# Patient Record
Sex: FEMALE | Race: WHITE | NOT HISPANIC OR LATINO | URBAN - METROPOLITAN AREA
[De-identification: names, ages, dates, MRNs, and addresses within clinical notes are randomized per-mention and may not be internally consistent; named-entity substitution may affect disease eponyms.]

---

## 2021-06-04 ENCOUNTER — OFFICE VISIT (OUTPATIENT)
Dept: INTERNAL MEDICINE | Facility: CLINIC | Age: 26
End: 2021-06-04

## 2021-06-04 VITALS
SYSTOLIC BLOOD PRESSURE: 128 MMHG | HEART RATE: 92 BPM | HEIGHT: 63 IN | TEMPERATURE: 98.6 F | DIASTOLIC BLOOD PRESSURE: 70 MMHG | BODY MASS INDEX: 29.13 KG/M2 | OXYGEN SATURATION: 98 % | WEIGHT: 164.4 LBS

## 2021-06-04 DIAGNOSIS — F41.9 ANXIETY AND DEPRESSION: Primary | ICD-10-CM

## 2021-06-04 DIAGNOSIS — F32.A ANXIETY AND DEPRESSION: Primary | ICD-10-CM

## 2021-06-04 PROCEDURE — 99203 OFFICE O/P NEW LOW 30 MIN: CPT | Performed by: FAMILY MEDICINE

## 2021-06-04 RX ORDER — SERTRALINE HYDROCHLORIDE 25 MG/1
25 TABLET, FILM COATED ORAL DAILY
Qty: 30 TABLET | Refills: 1 | Status: SHIPPED | OUTPATIENT
Start: 2021-06-04 | End: 2021-07-08 | Stop reason: DRUGHIGH
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
## 2021-07-08 ENCOUNTER — OFFICE VISIT (OUTPATIENT)
Dept: INTERNAL MEDICINE | Facility: CLINIC | Age: 26
End: 2021-07-08

## 2021-07-08 VITALS
HEIGHT: 63 IN | WEIGHT: 163 LBS | BODY MASS INDEX: 28.88 KG/M2 | DIASTOLIC BLOOD PRESSURE: 78 MMHG | OXYGEN SATURATION: 95 % | SYSTOLIC BLOOD PRESSURE: 130 MMHG | HEART RATE: 82 BPM | TEMPERATURE: 97.8 F

## 2021-07-08 DIAGNOSIS — F32.5 MAJOR DEPRESSIVE DISORDER WITH SINGLE EPISODE, IN FULL REMISSION (HCC): ICD-10-CM

## 2021-07-08 DIAGNOSIS — F41.9 ANXIETY: Primary | ICD-10-CM

## 2021-07-08 PROCEDURE — 99213 OFFICE O/P EST LOW 20 MIN: CPT | Performed by: FAMILY MEDICINE

## 2021-07-08 NOTE — PROGRESS NOTES
"Syd Jaeger is a 25 y.o. female.     Chief Complaint   Patient presents with   • Anxiety     follow up on meds, going good, may ask for increase.   • Depression       Visit Vitals  /78   Pulse 82   Temp 97.8 °F (36.6 °C)   Ht 160.5 cm (63.2\")   Wt 73.9 kg (163 lb)   SpO2 95%   BMI 28.69 kg/m²         Anxiety  Presents for follow-up visit. Symptoms include nausea. Patient reports no chest pain, compulsions, confusion, decreased concentration, depressed mood, dizziness, dry mouth, excessive worry, feeling of choking, hyperventilation, insomnia, irritability, malaise, muscle tension, nervous/anxious behavior, obsessions, palpitations, panic, restlessness, shortness of breath or suicidal ideas. Symptoms occur occasionally. The quality of sleep is good. Nighttime awakenings: none.     Her past medical history is significant for depression. Compliance with medications is %.   Depression  Visit Type: follow-up  Patient presents with the following symptoms: nausea.  Patient is not experiencing: anhedonia, chest pain, choking sensation, compulsions, confusion, decreased concentration, depressed mood, dizziness, dry mouth, excessive worry, fatigue, feelings of hopelessness, feelings of worthlessness, hypersomnia, hyperventilation, insomnia, irritability, malaise, memory impairment, muscle tension, nervousness/anxiety, obsessions, palpitations, panic, psychomotor agitation, psychomotor retardation, restlessness, shortness of breath, suicidal ideas, suicidal planning, thoughts of death, weight gain and weight loss.  Frequency of symptoms: rarely   Severity: mild   Sleep quality: good  Nighttime awakenings: none  Compliance with medications:  %           Pt here for anxiety and depression. Pt has been on the medication for the past month.  Pt has a few spurts of anxiety the past month and would like and increased dose.     The following portions of the patient's history were reviewed and " updated as appropriate: allergies, current medications, past family history, past medical history, past social history, past surgical history and problem list.    History reviewed. No pertinent past medical history.   Past Surgical History:   Procedure Laterality Date   • WISDOM TOOTH EXTRACTION  2014      History reviewed. No pertinent family history.   Social History     Socioeconomic History   • Marital status: Single     Spouse name: Not on file   • Number of children: Not on file   • Years of education: Not on file   • Highest education level: Not on file   Tobacco Use   • Smoking status: Current Every Day Smoker     Packs/day: 0.50     Years: 4.00     Pack years: 2.00     Types: Cigarettes   • Smokeless tobacco: Never Used   Vaping Use   • Vaping Use: Never used   Substance and Sexual Activity   • Alcohol use: Yes     Comment: couple times a month   • Drug use: Never   • Sexual activity: Not Currently     Partners: Male     Birth control/protection: OCP      Allergies   Allergen Reactions   • Salicylates Itching       Review of Systems   Constitutional: Negative for irritability, weight gain and weight loss.   Respiratory: Negative for choking and shortness of breath.    Cardiovascular: Negative for chest pain and palpitations.   Gastrointestinal: Positive for nausea.   Neurological: Negative for dizziness.   Psychiatric/Behavioral: Negative for confusion, decreased concentration and suicidal ideas. The patient is not nervous/anxious and does not have insomnia.        Objective   Physical Exam  Vitals and nursing note reviewed.   Constitutional:       Appearance: She is well-developed.   HENT:      Head: Normocephalic.      Right Ear: External ear normal.      Left Ear: External ear normal.      Nose: Nose normal.   Eyes:      General: Lids are normal.      Conjunctiva/sclera: Conjunctivae normal.      Pupils: Pupils are equal, round, and reactive to light.   Neck:      Thyroid: No thyroid mass or thyromegaly.       Vascular: No carotid bruit.      Trachea: Trachea normal.   Cardiovascular:      Rate and Rhythm: Normal rate and regular rhythm.      Heart sounds: No murmur heard.     Pulmonary:      Effort: Pulmonary effort is normal. No respiratory distress.      Breath sounds: Normal breath sounds. No decreased breath sounds, wheezing, rhonchi or rales.   Chest:      Chest wall: No tenderness.   Abdominal:      General: Bowel sounds are normal.      Palpations: Abdomen is soft.      Tenderness: There is no abdominal tenderness.   Musculoskeletal:         General: Normal range of motion.      Cervical back: Normal range of motion and neck supple.   Skin:     General: Skin is warm and dry.   Neurological:      Mental Status: She is alert and oriented to person, place, and time.   Psychiatric:         Behavior: Behavior normal.         Assessment/Plan   Diagnoses and all orders for this visit:    1. Anxiety (Primary)  -     sertraline (ZOLOFT) 50 MG tablet; Take 1 tablet by mouth Daily.  Dispense: 30 tablet; Refill: 3    2. Major depressive disorder with single episode, in full remission (CMS/[facility])  -     sertraline (ZOLOFT) 50 MG tablet; Take 1 tablet by mouth Daily.  Dispense: 30 tablet; Refill: 3        Handout on smoking cessation           Current Outpatient Medications:   •  sertraline (ZOLOFT) 50 MG tablet, Take 1 tablet by mouth Daily., Disp: 30 tablet, Rfl: 3    Return in about 3 months (around 10/8/2021), or if symptoms worsen or fail to improve, for Recheck.

## 2021-08-26 ENCOUNTER — TELEPHONE (OUTPATIENT)
Dept: INTERNAL MEDICINE | Facility: CLINIC | Age: 26
End: 2021-08-26

## 2021-08-26 NOTE — TELEPHONE ENCOUNTER
If there is no infection she can try T Gel or a tar shampoo and otc cortisone cream on the worst area  
PN and verbalized understanding.  
Provider: HARRISON     Caller: DENILSON MARLOW     Pharmacy: [facility] [address]: [phone]    Phone Number: [phone]    Reason for Call: PATIENT THINKS SHE MAY BE HAVING A FLARE OF HER PSORIASIS AND IS EXPERIENCING TENDERNESS IN SCALP, SWELLING LYMPH NODES, INTENSE ITCHING.  PATIENT WOULD LIKE ADVISEMENT.    
no

## 2021-08-27 PROCEDURE — U0004 COV-19 TEST NON-CDC HGH THRU: HCPCS | Performed by: NURSE PRACTITIONER

## 2021-09-07 ENCOUNTER — OFFICE VISIT (OUTPATIENT)
Dept: INTERNAL MEDICINE | Facility: CLINIC | Age: 26
End: 2021-09-07

## 2021-09-07 VITALS
DIASTOLIC BLOOD PRESSURE: 66 MMHG | SYSTOLIC BLOOD PRESSURE: 116 MMHG | WEIGHT: 166.2 LBS | OXYGEN SATURATION: 99 % | BODY MASS INDEX: 29.45 KG/M2 | RESPIRATION RATE: 16 BRPM | HEART RATE: 98 BPM | HEIGHT: 63 IN | TEMPERATURE: 97.8 F

## 2021-09-07 DIAGNOSIS — R59.0 LYMPHADENOPATHY, OCCIPITAL: Primary | ICD-10-CM

## 2021-09-07 DIAGNOSIS — L40.9 SCALP PSORIASIS: ICD-10-CM

## 2021-09-07 PROCEDURE — 99214 OFFICE O/P EST MOD 30 MIN: CPT | Performed by: PHYSICIAN ASSISTANT

## 2021-09-07 RX ORDER — AMOXICILLIN AND CLAVULANATE POTASSIUM 875; 125 MG/1; MG/1
1 TABLET, FILM COATED ORAL 2 TIMES DAILY
Qty: 20 TABLET | Refills: 0 | Status: SHIPPED | OUTPATIENT
Start: 2021-09-07 | End: 2021-09-17

## 2021-09-07 RX ORDER — NAPROXEN 250 MG/1
TABLET ORAL
Qty: 60 TABLET | Refills: 0 | Status: SHIPPED | OUTPATIENT
Start: 2021-09-07 | End: 2021-10-07

## 2021-09-07 NOTE — ASSESSMENT & PLAN NOTE
Etiology unknown however likely related to psoriasis and/or skin infection from psoriasis as these nodes drain scalp area. Will treat with Augmentin, pt to f/u if not having improvement over the next 2-3 d or if sx worsen. Monitor for new sx such as difficulty turning head, headaches, fevers.

## 2021-09-07 NOTE — PROGRESS NOTES
Chief Complaint  Edema (swelling in sides and back of neck. previously seen at Derm and .) and bumps in neck (two lumps on either side of neck and base of skull.)    Subjective     {CC  Problem List  Visit Diagnosis   Encounters  Notes  Medications  Labs  Result Review Imaging  Media :23}     History of Present Illness  Gwendolyn Jaeger presents to [facility] for   Swelling in head:  2 weeks ago on the way home from work she had pain in the back of her head/neck area. She went to  a day after and they dx with neck pain and gave her muscle relaxers. She had a low grade fever a few days later. She had negative strep test and negative covid test that day. She also saw Derm that day due to scalp psoriasis and they said they didn't think it was from psoriasis, that it should not cause that much pain. She has not had fever in the last week. The pain is slightly improved but the lumps are still there. They are tender.         Review of Systems   Constitutional: Negative for fever and unexpected weight loss.   Respiratory: Negative for cough, shortness of breath and wheezing.    Cardiovascular: Negative for chest pain and palpitations.   Musculoskeletal: Positive for neck pain and neck stiffness.   Skin: Positive for rash.       The following portions of the patient's history were reviewed and updated as appropriate: allergies, current medications, past family history, past medical history, past social history, past surgical history and problem list.  Allergies   Allergen Reactions   • Salicylates Itching     Topical meds cause irritation      Current Outpatient Medications on File Prior to Visit   Medication Sig Dispense Refill   • ketoconazole (NIZORAL) 2 % shampoo      • sertraline (ZOLOFT) 50 MG tablet Take 1 tablet by mouth Daily. 30 tablet 3     No current facility-administered medications on file prior to visit.       Social History     Tobacco Use   Smoking Status  "Current Every Day Smoker   • Packs/day: 0.50   • Years: 4.00   • Pack years: 2.00   • Types: Cigarettes   Smokeless Tobacco Never Used        Objective   Vital Signs:   Vitals:    21 1325   BP: 116/66   Pulse: 98   Resp: 16   Temp: 97.8 °F (36.6 °C)   SpO2: 99%   Weight: 75.4 kg (166 lb 3.2 oz)   Height: 160 cm (63\")      Physical Exam  Vitals reviewed.   Constitutional:       General: She is not in acute distress.     Appearance: Normal appearance.   HENT:      Head: Normocephalic and atraumatic.   Eyes:      General: No scleral icterus.     Extraocular Movements: Extraocular movements intact.      Conjunctiva/sclera: Conjunctivae normal.   Neck:     Cardiovascular:      Rate and Rhythm: Normal rate and regular rhythm.      Heart sounds: Normal heart sounds. No murmur heard.     Pulmonary:      Effort: Pulmonary effort is normal. No respiratory distress.      Breath sounds: Normal breath sounds. No stridor. No wheezing or rhonchi.   Musculoskeletal:      Cervical back: Normal range of motion and neck supple. Edema and erythema present. Muscular tenderness present.   Skin:     General: Skin is warm and dry.      Coloration: Skin is not jaundiced.      Findings: Rash (scalp psoriasis noted) present.   Neurological:      General: No focal deficit present.      Mental Status: She is alert and oriented to person, place, and time.      Gait: Gait normal.   Psychiatric:         Mood and Affect: Mood normal.         Behavior: Behavior normal.          Result Review :                New Medications Ordered This Visit   Medications   • amoxicillin-clavulanate (Augmentin) 875-125 MG per tablet     Sig: Take 1 tablet by mouth 2 (Two) Times a Day.     Dispense:  20 tablet     Refill:  0   • naproxen (NAPROSYN) 250 MG tablet     Si-2 tablet Q 12 hr PRN pain     Dispense:  60 tablet     Refill:  0          Assessment and Plan    Diagnoses and all orders for this visit:    1. Lymphadenopathy, occipital " (Primary)  Assessment & Plan:  Etiology unknown however likely related to psoriasis and/or skin infection from psoriasis as these nodes drain scalp area. Will treat with Augmentin, pt to f/u if not having improvement over the next 2-3 d or if sx worsen. Monitor for new sx such as difficulty turning head, headaches, fevers.     Orders:  -     amoxicillin-clavulanate (Augmentin) 875-125 MG per tablet; Take 1 tablet by mouth 2 (Two) Times a Day.  Dispense: 20 tablet; Refill: 0  -     naproxen (NAPROSYN) 250 MG tablet; 1-2 tablet Q 12 hr PRN pain  Dispense: 60 tablet; Refill: 0    2. Scalp psoriasis  Assessment & Plan:  Cont current tx and f/u with derm prn        Follow Up   Return in about 1 week (around 9/14/2021).    Follow up if symptoms worsen or persist or has new or concerning symptoms, go to ER for severe symptoms.   Reviewed common medication effects and side effects and advised to report side effects immediately, the patient expressed good understanding.  Encouraged medication compliance and the importance of keeping scheduled follow up appointments with me and any other providers  If labs or images are ordered we will contact you with the results within the next week.  If you have not heard from us after a week please call our office to inquire about the results.   Patient was given instructions and counseling regarding her condition or for health maintenance advice. Please see specific information pulled into the AVS if appropriate.     Yana Tabor PA-C    * Please note that portions of this note may have been completed with a voice recognition program. Efforts were made to edit the dictation but occasionally words are erroneously transcribed.

## 2021-09-17 ENCOUNTER — OFFICE VISIT (OUTPATIENT)
Dept: INTERNAL MEDICINE | Facility: CLINIC | Age: 26
End: 2021-09-17

## 2021-09-17 ENCOUNTER — TELEPHONE (OUTPATIENT)
Dept: INTERNAL MEDICINE | Facility: CLINIC | Age: 26
End: 2021-09-17

## 2021-09-17 ENCOUNTER — HOSPITAL ENCOUNTER (OUTPATIENT)
Dept: ULTRASOUND IMAGING | Facility: HOSPITAL | Age: 26
Discharge: HOME | End: 2021-09-17
Admitting: PHYSICIAN ASSISTANT

## 2021-09-17 VITALS
SYSTOLIC BLOOD PRESSURE: 118 MMHG | TEMPERATURE: 97.3 F | WEIGHT: 164.4 LBS | HEART RATE: 98 BPM | HEIGHT: 63 IN | RESPIRATION RATE: 20 BRPM | DIASTOLIC BLOOD PRESSURE: 68 MMHG | BODY MASS INDEX: 29.13 KG/M2 | OXYGEN SATURATION: 98 %

## 2021-09-17 DIAGNOSIS — R22.1 NECK MASS: ICD-10-CM

## 2021-09-17 DIAGNOSIS — R59.0 LYMPHADENOPATHY, OCCIPITAL: ICD-10-CM

## 2021-09-17 DIAGNOSIS — R22.1 NECK MASS: Primary | ICD-10-CM

## 2021-09-17 PROCEDURE — 76536 US EXAM OF HEAD AND NECK: CPT

## 2021-09-17 PROCEDURE — 99214 OFFICE O/P EST MOD 30 MIN: CPT | Performed by: PHYSICIAN ASSISTANT

## 2021-09-17 RX ORDER — SULFAMETHOXAZOLE AND TRIMETHOPRIM 800; 160 MG/1; MG/1
1 TABLET ORAL 2 TIMES DAILY
Qty: 20 TABLET | Refills: 0 | Status: SHIPPED | OUTPATIENT
Start: 2021-09-17 | End: 2021-09-28

## 2021-09-17 NOTE — TELEPHONE ENCOUNTER
Caller: Gwendolyn Jaeger    Relationship: Self    Best call back number: [phone]    Who are you requesting to speak with (clinical staff, provider,  specific staff member): CLINICAL STAFF    What was the call regarding: PATIENT STATED SHE SAW LIZABETH ESTEVEZ ON 9/7/21 AND WAS PRESCRIBED ANTIBIOTICS. PATIENT STATED SHE HAS FINISHED HER ANTIBIOTICS AND WHILE HER PAIN HAS GOTTEN BETTER HER LYMPH NODES ARE STILL VERY SWOLLEN. PATIENT WOULD LIKE TO KNOW IF SHE NEEDS TO BE SEEN AGAIN OR IF SHE NEEDS ANOTHER ROUND OF ANTIBIOTICS.    Do you require a callback: YES

## 2021-09-17 NOTE — TELEPHONE ENCOUNTER
Please have her come in for follow up. The lymph nodes may not go back to normal however if they are still significantly inflamed we may order imaging.

## 2021-09-21 PROBLEM — R22.1 NECK MASS: Status: ACTIVE | Noted: 2021-09-21

## 2021-09-21 NOTE — ASSESSMENT & PLAN NOTE
Unsure if these are lympnodes now due to soft almost fluid consistency, will sched u/s of neck. rx bactrim to cover for skin infection/abscess.

## 2021-09-21 NOTE — PROGRESS NOTES
Chief Complaint  Adenopathy (no pain but lumps are still large, tender and squishy )    Subjective          History of Present Illness  Gwendolyn Jaeger presents to [facility] for   Neck Masses:  Was seen previously for masses on the back of her neck and we rxed Augmentin. This helped her sx some and the masses are not as tender now but they are still present which concerns her. She has not had fever, no spreading redness or discharge from the areas. Has hx of psoriasis and we had thought they were nodes related to possible skin infection or flare of psoriasis but they feel full of fluid now and are not as hard. No new spots, no new symptoms. They are a little tender if you push on them.       Review of Systems   Constitutional: Negative for fever and unexpected weight loss.   Respiratory: Negative for cough, shortness of breath and wheezing.    Cardiovascular: Negative for chest pain and palpitations.   Musculoskeletal: Positive for neck pain and neck stiffness.   Skin: Positive for rash.       The following portions of the patient's history were reviewed and updated as appropriate: allergies, current medications, past family history, past medical history, past social history, past surgical history and problem list.  Allergies   Allergen Reactions   • Salicylates Itching     Topical meds cause irritation      Current Outpatient Medications on File Prior to Visit   Medication Sig Dispense Refill   • ketoconazole (NIZORAL) 2 % shampoo      • sertraline (ZOLOFT) 50 MG tablet Take 1 tablet by mouth Daily. 30 tablet 3   • naproxen (NAPROSYN) 250 MG tablet 1-2 tablet Q 12 hr PRN pain 60 tablet 0     No current facility-administered medications on file prior to visit.       Social History     Tobacco Use   Smoking Status Current Every Day Smoker   • Packs/day: 0.50   • Years: 4.00   • Pack years: 2.00   • Types: Cigarettes   Smokeless Tobacco Never Used        Objective   Vital Signs:  "  Vitals:    09/17/21 1408   BP: 118/68   Pulse: 98   Resp: 20   Temp: 97.3 °F (36.3 °C)   TempSrc: Temporal   SpO2: 98%   Weight: 74.6 kg (164 lb 6.4 oz)   Height: 160 cm (63\")   PainSc:   1   PainLoc: Neck      Physical Exam  Vitals reviewed.   Constitutional:       General: She is not in acute distress.     Appearance: Normal appearance.   HENT:      Head: Normocephalic and atraumatic.   Eyes:      General: No scleral icterus.     Extraocular Movements: Extraocular movements intact.      Conjunctiva/sclera: Conjunctivae normal.   Neck:        Comments: Overall improved from previous with less tenderness and masses are softer  Cardiovascular:      Rate and Rhythm: Normal rate and regular rhythm.      Heart sounds: Normal heart sounds. No murmur heard.     Pulmonary:      Effort: Pulmonary effort is normal. No respiratory distress.      Breath sounds: Normal breath sounds. No stridor. No wheezing or rhonchi.   Musculoskeletal:      Cervical back: Normal range of motion and neck supple. Edema and erythema present. Muscular tenderness present.   Skin:     General: Skin is warm and dry.      Coloration: Skin is not jaundiced.      Findings: Rash (scalp psoriasis noted) present.   Neurological:      General: No focal deficit present.      Mental Status: She is alert and oriented to person, place, and time.      Gait: Gait normal.   Psychiatric:         Mood and Affect: Mood normal.         Behavior: Behavior normal.          Result Review :                New Medications Ordered This Visit   Medications   • sulfamethoxazole-trimethoprim (Bactrim DS) 800-160 MG per tablet     Sig: Take 1 tablet by mouth 2 (Two) Times a Day.     Dispense:  20 tablet     Refill:  0          Assessment and Plan    Diagnoses and all orders for this visit:    1. Neck mass (Primary)  Assessment & Plan:  Unsure if these are lympnodes now due to soft almost fluid consistency, will sched u/s of neck. rx bactrim to cover for skin " infection/abscess.     Orders:  -     US Head Neck Soft Tissue; Future  -     sulfamethoxazole-trimethoprim (Bactrim DS) 800-160 MG per tablet; Take 1 tablet by mouth 2 (Two) Times a Day.  Dispense: 20 tablet; Refill: 0    2. Lymphadenopathy, occipital  -     US Head Neck Soft Tissue; Future      Follow Up   Return for Next scheduled follow up.    Follow up if symptoms worsen or persist or has new or concerning symptoms, go to ER for severe symptoms.   Reviewed common medication effects and side effects and advised to report side effects immediately, the patient expressed good understanding.  Encouraged medication compliance and the importance of keeping scheduled follow up appointments with me and any other providers  If labs or images are ordered we will contact you with the results within the next week.  If you have not heard from us after a week please call our office to inquire about the results.   Patient was given instructions and counseling regarding her condition or for health maintenance advice. Please see specific information pulled into the AVS if appropriate.     Yana Tabor PA-C    * Please note that portions of this note may have been completed with a voice recognition program. Efforts were made to edit the dictation but occasionally words are erroneously transcribed.

## 2021-09-24 ENCOUNTER — OFFICE VISIT (OUTPATIENT)
Dept: INTERNAL MEDICINE | Facility: CLINIC | Age: 26
End: 2021-09-24

## 2021-09-24 ENCOUNTER — TELEPHONE (OUTPATIENT)
Dept: INTERNAL MEDICINE | Facility: CLINIC | Age: 26
End: 2021-09-24

## 2021-09-24 VITALS
WEIGHT: 167 LBS | OXYGEN SATURATION: 98 % | TEMPERATURE: 98.4 F | HEIGHT: 63 IN | RESPIRATION RATE: 16 BRPM | DIASTOLIC BLOOD PRESSURE: 78 MMHG | HEART RATE: 92 BPM | SYSTOLIC BLOOD PRESSURE: 142 MMHG | BODY MASS INDEX: 29.59 KG/M2

## 2021-09-24 DIAGNOSIS — L02.11 ABSCESS OF SKIN OF NECK: ICD-10-CM

## 2021-09-24 DIAGNOSIS — Z76.89 ENCOUNTER FOR INCISION AND DRAINAGE PROCEDURE: Primary | ICD-10-CM

## 2021-09-24 PROCEDURE — 10060 I&D ABSCESS SIMPLE/SINGLE: CPT | Performed by: STUDENT IN AN ORGANIZED HEALTH CARE EDUCATION/TRAINING PROGRAM

## 2021-09-24 PROCEDURE — 99213 OFFICE O/P EST LOW 20 MIN: CPT | Performed by: STUDENT IN AN ORGANIZED HEALTH CARE EDUCATION/TRAINING PROGRAM

## 2021-09-24 RX ORDER — SULFAMETHOXAZOLE AND TRIMETHOPRIM 800; 160 MG/1; MG/1
1 TABLET ORAL 2 TIMES DAILY
Qty: 10 TABLET | Refills: 0 | Status: SHIPPED | OUTPATIENT
Start: 2021-09-28 | End: 2021-09-28

## 2021-09-24 NOTE — PROGRESS NOTES
"Chief Complaint  Abscess (Bilteral abscess on each side of the back of her neck. Right by the hairline.)    Subjective      Patient here for recheck of her neck skin abscesses.  She was seen last on 9/17/2021 for this and was prescribed Bactrim.  She states that the abscesses have decreased significantly since starting antibiotics. Additionally she had an ultrasound of her neck done which confirmed the presence of fluid collections in the posterior neck.  She states that her pain has significantly decreased since starting antibiotics.  She denies any fevers or chills.      The following portions of the patient's history were reviewed and updated as appropriate: allergies, current medications, past family history, past medical history, past social history, past surgical history and problem list.      Allergies   Allergen Reactions   • Salicylates Itching     Topical meds cause irritation      Current Outpatient Medications on File Prior to Visit   Medication Sig Dispense Refill   • ketoconazole (NIZORAL) 2 % shampoo      • naproxen (NAPROSYN) 250 MG tablet 1-2 tablet Q 12 hr PRN pain 60 tablet 0   • sertraline (ZOLOFT) 50 MG tablet Take 1 tablet by mouth Daily. 30 tablet 3   • sulfamethoxazole-trimethoprim (Bactrim DS) 800-160 MG per tablet Take 1 tablet by mouth 2 (Two) Times a Day. 20 tablet 0     No current facility-administered medications on file prior to visit.       Social History     Tobacco Use   Smoking Status Current Every Day Smoker   • Packs/day: 0.50   • Years: 4.00   • Pack years: 2.00   • Types: Cigarettes   Smokeless Tobacco Never Used        Objective   Vital Signs:   Vitals:    09/24/21 1344   BP: 142/78   Pulse: 92   Resp: 16   Temp: 98.4 °F (36.9 °C)   SpO2: 98%   Weight: 75.8 kg (167 lb)   Height: 160 cm (63\")      Physical Exam  Vitals reviewed.   Constitutional:       General: She is not in acute distress.     Appearance: Normal appearance.   HENT:      Head: Normocephalic and atraumatic. "   Eyes:      General: No scleral icterus.     Extraocular Movements: Extraocular movements intact.      Conjunctiva/sclera: Conjunctivae normal.   Neck:        Comments: Overall improved from previous with less tenderness and masses are softer  Cardiovascular:      Rate and Rhythm: Normal rate and regular rhythm.      Heart sounds: Normal heart sounds. No murmur heard.     Pulmonary:      Effort: Pulmonary effort is normal. No respiratory distress.      Breath sounds: Normal breath sounds. No stridor. No wheezing or rhonchi.   Musculoskeletal:      Cervical back: Normal range of motion and neck supple. Edema and erythema present. Muscular tenderness present.   Skin:     General: Skin is warm and dry.      Coloration: Skin is not jaundiced.      Findings: Rash (scalp psoriasis noted) present.   Neurological:      General: No focal deficit present.      Mental Status: She is alert and oriented to person, place, and time.      Gait: Gait normal.   Psychiatric:         Mood and Affect: Mood normal.         Behavior: Behavior normal.        US Head Neck Soft Tissue    Result Date: 9/17/2021  EXAMINATION: US HEAD NECK SOFT TISSUE-  INDICATION: lymphadenopathy, masses in neck, concern abscess; R59.0-Localized enlarged lymph nodes; R22.1-Localized swelling, mass and lump, neck  TECHNIQUE: Directed ultrasound to the areas indicated by the patient in the posterior right and left neck.  COMPARISON: NONE  FINDINGS: History indicates lymphadenopathy, neck masses, cutaneous lesions. Recent antibiotic therapy. According to the patient, there have been red swollen areas in the posterior neck at the hairline for approximately three weeks, previously painful and warm to touch, improved after a round of antibiotics, and which appear to fluctuate in size and location. No fever now for approximately three weeks. Patient has a history of psoriasis.  The posterior right and left neck masses correspond to complex fluid collections one each  on the right and left, on the right, more fluid appearing and movable with pressure from the ultrasound probe, 5.5 x 1.7 x 5.3 cm in maximal diameter.  The left-sided mass/collection, according to the sonographer has a more firm and unchanging appearance at sonography, but is also apparently partially cystic. It does appear to have more of an internal architecture with multiple internal septations.  Color Doppler flow is seen around the periphery of both lesions, but not considered hyperemic, and accordingly, no clearly acute inflammation is appreciated. There is no evidence of edema in the adjacent soft tissues, and no adjacent adenopathy.       Bilateral subcutaneous complex fluid collections the posterior neck, relatively firm and multiseptated on the left, more liquid, and changeable in shape on the right. The patient's history suggests these may represent partially treated abscesses. Differential would include partially organized hematomas, less likely, and which typically have a somewhat different ultrasound appearance.   This report was finalized on 9/17/2021 8:29 PM by Dr. Jake Kramer MD.            New Medications Ordered This Visit   Medications   • sulfamethoxazole-trimethoprim (Bactrim DS) 800-160 MG per tablet     Sig: Take 1 tablet by mouth 2 (Two) Times a Day for 5 days.     Dispense:  10 tablet     Refill:  0          Assessment and Plan    Diagnoses and all orders for this visit:    1. Encounter for incision and drainage procedure (Primary)  -     sulfamethoxazole-trimethoprim (Bactrim DS) 800-160 MG per tablet; Take 1 tablet by mouth 2 (Two) Times a Day for 5 days.  Dispense: 10 tablet; Refill: 0  -     Incision & Drainage    2. Abscess of skin of neck  Comments:  Improving with bactrim. Drained remaining amount. Continue warm compresses and abx.   Orders:  -     sulfamethoxazole-trimethoprim (Bactrim DS) 800-160 MG per tablet; Take 1 tablet by mouth 2 (Two) Times a Day for 5 days.  Dispense: 10  tablet; Refill: 0  -     Incision & Drainage        Follow Up   Return in about 1 week (around 10/1/2021) for Recheck.    Follow up if symptoms worsen or persist or has new or concerning symptoms, go to ER for severe symptoms.   Reviewed common medication effects and side effects and advised to report side effects immediately, the patient expressed good understanding.  Encouraged medication compliance and the importance of keeping scheduled follow up appointments with me and any other providers  If labs or images are ordered we will contact you with the results within the next week.  If you have not heard from us after a week please call our office to inquire about the results.   Patient was given instructions and counseling regarding her condition or for health maintenance advice. Please see specific information pulled into the AVS if appropriate.     Kadeem Rios MD    * Please note that portions of this note may have been completed with a voice recognition program. Efforts were made to edit the dictation but occasionally words are erroneously transcribed.     Incision & Drainage    Date/Time: 9/24/2021 2:13 PM  Performed by: Kadeem Rios MD  Authorized by: Kadeem Rios MD   Type: abscess  Body area: head/neck  Location details: neck  Anesthesia: local infiltration    Anesthesia:  Local Anesthetic: lidocaine 1% with epinephrine  Drainage: serosanguinous  Drainage amount: moderate  Wound treatment: wound left open  Packing material: Vaseline gauze  Comments: Patient tolerated procedure without complication

## 2021-09-24 NOTE — TELEPHONE ENCOUNTER
Caller: Gwendolyn Jaeger    Relationship to patient: Self    Best call back number: [phone]    Patient is needing: PATIENT STATED THAT SHE WOULD LIKE TO TALK TO NURSE ABOUT LEFT ABSENCE HAS BUSTED ON BACK ON HEAD AND DRAINING AND IT APPEARS TO BE FILLING BACK UP AGAIN AND WOULD LIKE TO KNOW WHAT TO DO       PLEASE ADVISE

## 2021-09-24 NOTE — TELEPHONE ENCOUNTER
Scheduled an appointment today with Dr. Rios to evaluate. Dr. Serrano (PCP) is out of the office and Yana (seen last for this problem) did not have any available appointment

## 2021-09-28 ENCOUNTER — LAB (OUTPATIENT)
Dept: LAB | Facility: HOSPITAL | Age: 26
End: 2021-09-28

## 2021-09-28 ENCOUNTER — OFFICE VISIT (OUTPATIENT)
Dept: INTERNAL MEDICINE | Facility: CLINIC | Age: 26
End: 2021-09-28

## 2021-09-28 VITALS
SYSTOLIC BLOOD PRESSURE: 126 MMHG | BODY MASS INDEX: 29.59 KG/M2 | RESPIRATION RATE: 20 BRPM | HEART RATE: 93 BPM | WEIGHT: 167 LBS | OXYGEN SATURATION: 99 % | DIASTOLIC BLOOD PRESSURE: 80 MMHG | HEIGHT: 63 IN | TEMPERATURE: 96.9 F

## 2021-09-28 DIAGNOSIS — M79.642 BILATERAL HAND PAIN: ICD-10-CM

## 2021-09-28 DIAGNOSIS — R21 RASH: ICD-10-CM

## 2021-09-28 DIAGNOSIS — R22.1 NECK MASS: ICD-10-CM

## 2021-09-28 DIAGNOSIS — F41.9 ANXIETY: ICD-10-CM

## 2021-09-28 DIAGNOSIS — M79.641 BILATERAL HAND PAIN: ICD-10-CM

## 2021-09-28 DIAGNOSIS — L02.11 ABSCESS OF SKIN OF NECK: Primary | ICD-10-CM

## 2021-09-28 DIAGNOSIS — Z13.29 SCREENING FOR ENDOCRINE DISORDER: ICD-10-CM

## 2021-09-28 DIAGNOSIS — R59.0 LYMPHADENOPATHY, OCCIPITAL: ICD-10-CM

## 2021-09-28 PROCEDURE — 99214 OFFICE O/P EST MOD 30 MIN: CPT | Performed by: PHYSICIAN ASSISTANT

## 2021-09-28 PROCEDURE — 86038 ANTINUCLEAR ANTIBODIES: CPT | Performed by: PHYSICIAN ASSISTANT

## 2021-09-28 PROCEDURE — 86431 RHEUMATOID FACTOR QUANT: CPT | Performed by: PHYSICIAN ASSISTANT

## 2021-09-28 PROCEDURE — 85025 COMPLETE CBC W/AUTO DIFF WBC: CPT | Performed by: PHYSICIAN ASSISTANT

## 2021-09-28 PROCEDURE — 80053 COMPREHEN METABOLIC PANEL: CPT | Performed by: PHYSICIAN ASSISTANT

## 2021-09-28 PROCEDURE — 85652 RBC SED RATE AUTOMATED: CPT | Performed by: PHYSICIAN ASSISTANT

## 2021-09-28 PROCEDURE — 84443 ASSAY THYROID STIM HORMONE: CPT | Performed by: PHYSICIAN ASSISTANT

## 2021-09-28 PROCEDURE — 86200 CCP ANTIBODY: CPT | Performed by: PHYSICIAN ASSISTANT

## 2021-09-28 PROCEDURE — 36415 COLL VENOUS BLD VENIPUNCTURE: CPT | Performed by: PHYSICIAN ASSISTANT

## 2021-09-28 PROCEDURE — 86140 C-REACTIVE PROTEIN: CPT | Performed by: PHYSICIAN ASSISTANT

## 2021-09-28 RX ORDER — CLINDAMYCIN HYDROCHLORIDE 300 MG/1
300 CAPSULE ORAL 3 TIMES DAILY
Qty: 30 CAPSULE | Refills: 0 | Status: SHIPPED | OUTPATIENT
Start: 2021-09-28 | End: 2021-10-08

## 2021-09-28 RX ORDER — PREDNISONE 20 MG/1
20 TABLET ORAL DAILY
Qty: 5 TABLET | Refills: 0 | Status: SHIPPED | OUTPATIENT
Start: 2021-09-28 | End: 2021-10-07

## 2021-09-28 RX ORDER — HYDROXYZINE HYDROCHLORIDE 25 MG/1
25-50 TABLET, FILM COATED ORAL 3 TIMES DAILY PRN
Qty: 60 TABLET | Refills: 0 | Status: SHIPPED | OUTPATIENT
Start: 2021-09-28 | End: 2021-10-07 | Stop reason: SDUPTHER

## 2021-09-28 NOTE — ASSESSMENT & PLAN NOTE
Likely delayed rash from Bactrim, D/c bactrim and will start clinda to continue to cover for infection since the abscesses are swelling back up.  Prednisone and atarax for rash, f/u if worsens

## 2021-09-28 NOTE — PROGRESS NOTES
Chief Complaint  Rash (upper thigh on both legs ) and Adenopathy (seen on friday to get the cyst drained - cyst has enlarged )    Subjective          History of Present Illness  Gwendolyn Jaeger presents to [facility] for   Abscesses:  She has had bilateral occipital abscesses now for a month.   Initially it started with a swollen painful neck and low grade fever that developed into bilateral nodes but they ended up being abscesses. Started bactrim 10d ago and sx had improved some, they were drained last week but they seem to be collecting blood again.  Has been diagnosed with bullous pemphigoid in the past (summer of 2017, biopsy confirmed it, tx with steroids)    Hand pain:  Has had hand pain if she holds things for a while. She is not sure if it is carpal tunnel or arthritis. Wears wrist brace at night and it helps her sx. Has fhx of RA. Would like labs to look for lupus.     She also c/o TMJ, tinnitus, anxiety, fatigue, RLS, headaches, light sensitivity, vomiting once a month, psoriasis. Wondering if any of these things are related and would like labs.     Anxiety:  Doing ok on zoloft 50 up until the last few weeks. With all of her health conditions she thinks that has worsened her anxiety but she is not wanting to increase the zoloft yet.         Review of Systems   Constitutional: Positive for fatigue. Negative for fever and unexpected weight loss.   Respiratory: Negative for cough, shortness of breath and wheezing.    Cardiovascular: Negative for chest pain and palpitations.   Gastrointestinal: Positive for nausea, vomiting and indigestion. Negative for abdominal pain, constipation and diarrhea.   Musculoskeletal: Positive for arthralgias.   Skin: Positive for rash.   Neurological: Positive for headache.   Psychiatric/Behavioral: Positive for stress. Negative for self-injury, sleep disturbance, suicidal ideas and depressed mood. The patient is nervous/anxious.        The  "following portions of the patient's history were reviewed and updated as appropriate: allergies, current medications, past family history, past medical history, past social history, past surgical history and problem list.  Allergies   Allergen Reactions   • Bactrim [Sulfamethoxazole-Trimethoprim] Rash   • Salicylates Itching     Topical meds cause irritation      Current Outpatient Medications on File Prior to Visit   Medication Sig Dispense Refill   • ketoconazole (NIZORAL) 2 % shampoo      • naproxen (NAPROSYN) 250 MG tablet 1-2 tablet Q 12 hr PRN pain 60 tablet 0   • sertraline (ZOLOFT) 50 MG tablet Take 1 tablet by mouth Daily. 30 tablet 3   • [DISCONTINUED] sulfamethoxazole-trimethoprim (Bactrim DS) 800-160 MG per tablet Take 1 tablet by mouth 2 (Two) Times a Day. 20 tablet 0   • [DISCONTINUED] sulfamethoxazole-trimethoprim (Bactrim DS) 800-160 MG per tablet Take 1 tablet by mouth 2 (Two) Times a Day for 5 days. 10 tablet 0     No current facility-administered medications on file prior to visit.       Social History     Tobacco Use   Smoking Status Current Every Day Smoker   • Packs/day: 0.50   • Years: 4.00   • Pack years: 2.00   • Types: Cigarettes   Smokeless Tobacco Never Used        Objective   Vital Signs:   Vitals:    09/28/21 1144 09/28/21 1218   BP: 148/86 126/80   Pulse: 93    Resp: 20    Temp: 96.9 °F (36.1 °C)    TempSrc: Temporal    SpO2: 99%    Weight: 75.8 kg (167 lb)    Height: 160 cm (63\")    PainSc: 0-No pain       Physical Exam  Vitals reviewed.   Constitutional:       General: She is not in acute distress.     Appearance: Normal appearance.   HENT:      Head: Normocephalic and atraumatic.   Eyes:      General: No scleral icterus.     Extraocular Movements: Extraocular movements intact.      Conjunctiva/sclera: Conjunctivae normal.   Neck:        Comments: Overall significantly improved from previous with less tenderness and masses are softer, central scab noted. Does have new 1cm cervical " node on the left  Cardiovascular:      Rate and Rhythm: Normal rate and regular rhythm.      Heart sounds: Normal heart sounds. No murmur heard.     Pulmonary:      Effort: Pulmonary effort is normal. No respiratory distress.      Breath sounds: Normal breath sounds. No stridor. No wheezing or rhonchi.   Musculoskeletal:      Cervical back: Normal range of motion and neck supple. Edema and erythema present. No rigidity. No muscular tenderness.   Skin:     General: Skin is warm and dry.      Coloration: Skin is not jaundiced.      Findings: Rash (scalp psoriasis noted) present.      Comments: eryth papular rash on chest/back and upper inner thighs.   Neurological:      General: No focal deficit present.      Mental Status: She is alert and oriented to person, place, and time.      Gait: Gait normal.   Psychiatric:         Mood and Affect: Mood normal.         Behavior: Behavior normal.          Result Review :                New Medications Ordered This Visit   Medications   • predniSONE (DELTASONE) 20 MG tablet     Sig: Take 1 tablet by mouth Daily.     Dispense:  5 tablet     Refill:  0   • hydrOXYzine (ATARAX) 25 MG tablet     Sig: Take 1-2 tablets by mouth 3 (Three) Times a Day As Needed for Itching or Anxiety (rash).     Dispense:  60 tablet     Refill:  0   • clindamycin (CLEOCIN) 300 MG capsule     Sig: Take 1 capsule by mouth 3 (Three) Times a Day for 10 days.     Dispense:  30 capsule     Refill:  0          Assessment and Plan    Diagnoses and all orders for this visit:    1. Abscess of skin of neck (Primary)  Assessment & Plan:  D/c bactrim, rx clinda, refer to surgery    Orders:  -     Comprehensive Metabolic Panel; Future  -     CBC Auto Differential; Future  -     Comprehensive Metabolic Panel  -     CBC Auto Differential  -     clindamycin (CLEOCIN) 300 MG capsule; Take 1 capsule by mouth 3 (Three) Times a Day for 10 days.  Dispense: 30 capsule; Refill: 0  -     Ambulatory Referral to General  Surgery    2. Rash  Assessment & Plan:  Likely delayed rash from Bactrim, D/c bactrim and will start clinda to continue to cover for infection since the abscesses are swelling back up.  Prednisone and atarax for rash, f/u if worsens    Orders:  -     Comprehensive Metabolic Panel; Future  -     CBC Auto Differential; Future  -     predniSONE (DELTASONE) 20 MG tablet; Take 1 tablet by mouth Daily.  Dispense: 5 tablet; Refill: 0  -     hydrOXYzine (ATARAX) 25 MG tablet; Take 1-2 tablets by mouth 3 (Three) Times a Day As Needed for Itching or Anxiety (rash).  Dispense: 60 tablet; Refill: 0  -     Comprehensive Metabolic Panel  -     CBC Auto Differential    3. Lymphadenopathy, occipital  -     Comprehensive Metabolic Panel; Future  -     CBC Auto Differential; Future  -     Comprehensive Metabolic Panel  -     CBC Auto Differential  -     clindamycin (CLEOCIN) 300 MG capsule; Take 1 capsule by mouth 3 (Three) Times a Day for 10 days.  Dispense: 30 capsule; Refill: 0  -     Ambulatory Referral to General Surgery    4. Neck mass  Assessment & Plan:  Refer to surgery due to abscess/hematoma for 1 month now, failing tx with abx and failing I&D    Orders:  -     Ambulatory Referral to General Surgery    5. Bilateral hand pain  Assessment & Plan:  Check labs per pt request, likely related to carpal tunnel so cont to use wrist braces, but pt has fhx of RA.    Orders:  -     Rheumatoid factor; Future  -     C-reactive protein; Future  -     RAUL; Future  -     Sedimentation rate, automated; Future  -     Cyclic Citrul Peptide Antibody, IgG / IgA; Future  -     Rheumatoid factor  -     C-reactive protein  -     RAUL  -     Sedimentation rate, automated  -     Cyclic Citrul Peptide Antibody, IgG / IgA    6. Anxiety  Assessment & Plan:  Cont zoloft, will add prn atarax    Orders:  -     hydrOXYzine (ATARAX) 25 MG tablet; Take 1-2 tablets by mouth 3 (Three) Times a Day As Needed for Itching or Anxiety (rash).  Dispense: 60 tablet;  Refill: 0    7. Screening for endocrine disorder  -     TSH Rfx On Abnormal To Free T4; Future  -     TSH Rfx On Abnormal To Free T4      Follow Up   Return for Next scheduled follow up.    Follow up if symptoms worsen or persist or has new or concerning symptoms, go to ER for severe symptoms.   Reviewed common medication effects and side effects and advised to report side effects immediately, the patient expressed good understanding.  Encouraged medication compliance and the importance of keeping scheduled follow up appointments with me and any other providers.  If a referral was made please contact our office if you have not heard about an appointment in the next 2 weeks.   If labs or images are ordered we will contact you with the results within the next week.  If you have not heard from us after a week please call our office to inquire about the results.   Patient was given instructions and counseling regarding her condition or for health maintenance advice. Please see specific information pulled into the AVS if appropriate.     Yana Tabor PA-C    * Please note that portions of this note may have been completed with a voice recognition program. Efforts were made to edit the dictation but occasionally words are erroneously transcribed.

## 2021-09-28 NOTE — ASSESSMENT & PLAN NOTE
Check labs per pt request, likely related to carpal tunnel so cont to use wrist braces, but pt has fhx of RA.

## 2021-09-29 LAB
ALBUMIN SERPL-MCNC: 4.7 G/DL (ref 3.5–5.2)
ALBUMIN/GLOB SERPL: 1.7 G/DL
ALP SERPL-CCNC: 92 U/L (ref 39–117)
ALT SERPL W P-5'-P-CCNC: 20 U/L (ref 1–33)
ANA SER QL: NEGATIVE
ANION GAP SERPL CALCULATED.3IONS-SCNC: 12.1 MMOL/L (ref 5–15)
AST SERPL-CCNC: 17 U/L (ref 1–32)
BASOPHILS # BLD AUTO: 0.04 10*3/MM3 (ref 0–0.2)
BASOPHILS NFR BLD AUTO: 0.7 % (ref 0–1.5)
BILIRUB SERPL-MCNC: 0.3 MG/DL (ref 0–1.2)
BUN SERPL-MCNC: 7 MG/DL (ref 6–20)
BUN/CREAT SERPL: 9.5 (ref 7–25)
CALCIUM SPEC-SCNC: 9.1 MG/DL (ref 8.6–10.5)
CHLORIDE SERPL-SCNC: 104 MMOL/L (ref 98–107)
CHROMATIN AB SERPL-ACNC: <10 IU/ML (ref 0–14)
CO2 SERPL-SCNC: 22.9 MMOL/L (ref 22–29)
CREAT SERPL-MCNC: 0.74 MG/DL (ref 0.57–1)
CRP SERPL-MCNC: 0.48 MG/DL (ref 0–0.5)
DEPRECATED RDW RBC AUTO: 45 FL (ref 37–54)
EOSINOPHIL # BLD AUTO: 0.2 10*3/MM3 (ref 0–0.4)
EOSINOPHIL NFR BLD AUTO: 3.7 % (ref 0.3–6.2)
ERYTHROCYTE [DISTWIDTH] IN BLOOD BY AUTOMATED COUNT: 12.9 % (ref 12.3–15.4)
ERYTHROCYTE [SEDIMENTATION RATE] IN BLOOD: 22 MM/HR (ref 0–20)
GFR SERPL CREATININE-BSD FRML MDRD: 96 ML/MIN/1.73
GLOBULIN UR ELPH-MCNC: 2.8 GM/DL
GLUCOSE SERPL-MCNC: 75 MG/DL (ref 65–99)
HCT VFR BLD AUTO: 43.5 % (ref 34–46.6)
HGB BLD-MCNC: 14.3 G/DL (ref 12–15.9)
IMM GRANULOCYTES # BLD AUTO: 0.01 10*3/MM3 (ref 0–0.05)
IMM GRANULOCYTES NFR BLD AUTO: 0.2 % (ref 0–0.5)
LYMPHOCYTES # BLD AUTO: 1.44 10*3/MM3 (ref 0.7–3.1)
LYMPHOCYTES NFR BLD AUTO: 26.9 % (ref 19.6–45.3)
MCH RBC QN AUTO: 31.4 PG (ref 26.6–33)
MCHC RBC AUTO-ENTMCNC: 32.9 G/DL (ref 31.5–35.7)
MCV RBC AUTO: 95.4 FL (ref 79–97)
MONOCYTES # BLD AUTO: 0.7 10*3/MM3 (ref 0.1–0.9)
MONOCYTES NFR BLD AUTO: 13.1 % (ref 5–12)
NEUTROPHILS NFR BLD AUTO: 2.97 10*3/MM3 (ref 1.7–7)
NEUTROPHILS NFR BLD AUTO: 55.4 % (ref 42.7–76)
NRBC BLD AUTO-RTO: 0 /100 WBC (ref 0–0.2)
PLATELET # BLD AUTO: 290 10*3/MM3 (ref 140–450)
PMV BLD AUTO: 10 FL (ref 6–12)
POTASSIUM SERPL-SCNC: 4.1 MMOL/L (ref 3.5–5.2)
PROT SERPL-MCNC: 7.5 G/DL (ref 6–8.5)
RBC # BLD AUTO: 4.56 10*6/MM3 (ref 3.77–5.28)
SODIUM SERPL-SCNC: 139 MMOL/L (ref 136–145)
TSH SERPL DL<=0.05 MIU/L-ACNC: 1.41 UIU/ML (ref 0.27–4.2)
WBC # BLD AUTO: 5.36 10*3/MM3 (ref 3.4–10.8)

## 2021-09-30 ENCOUNTER — TELEPHONE (OUTPATIENT)
Dept: INTERNAL MEDICINE | Facility: CLINIC | Age: 26
End: 2021-09-30

## 2021-09-30 LAB — CCP IGA+IGG SERPL IA-ACNC: 5 UNITS (ref 0–19)

## 2021-09-30 NOTE — TELEPHONE ENCOUNTER
----- Message from Yana Tabor PA-C sent at 9/30/2021  1:23 PM EDT -----  Your labs looked good but you did have slight elevation in one of your inflammatory markers. This may simply be due to current infection. It is reassuring that your other labs are normal. Please keep your scheduled appointment with surgery Dr Wolf for tomorrow.

## 2021-10-01 ENCOUNTER — OFFICE VISIT (OUTPATIENT)
Dept: INTERNAL MEDICINE | Facility: CLINIC | Age: 26
End: 2021-10-01

## 2021-10-01 VITALS
HEART RATE: 83 BPM | BODY MASS INDEX: 29.66 KG/M2 | DIASTOLIC BLOOD PRESSURE: 70 MMHG | TEMPERATURE: 96.9 F | WEIGHT: 167.4 LBS | HEIGHT: 63 IN | OXYGEN SATURATION: 99 % | SYSTOLIC BLOOD PRESSURE: 110 MMHG

## 2021-10-01 DIAGNOSIS — L02.11 ABSCESS OF SKIN OF NECK: ICD-10-CM

## 2021-10-01 DIAGNOSIS — R21 RASH: ICD-10-CM

## 2021-10-01 DIAGNOSIS — Z86.2 HISTORY OF AUTOIMMUNE DISEASE: Primary | ICD-10-CM

## 2021-10-01 PROCEDURE — 99213 OFFICE O/P EST LOW 20 MIN: CPT | Performed by: STUDENT IN AN ORGANIZED HEALTH CARE EDUCATION/TRAINING PROGRAM

## 2021-10-01 NOTE — PROGRESS NOTES
Chief Complaint  Abscess    Gwendolyn Jaeger presents to [facility]      Subjective   Patient is a 26-year-old female who is here for follow-up regarding neck abscesses.  She was seen last a few days ago after developing a generalized rash.  This rash is believed to be due to Bactrim.  Patient was switched to clindamycin.  Her rash has improved.  In regards to her abscesses, she was seen by a surgeon (Dr. Wolf) earlier this morning.  No procedure was performed at that visit and patient was instructed to continue clindamycin and to follow-up with him next week for potential I&D if the abscesses were to get worse.  The surgeon also recommended patient follow-up with a rheumatologist due to these abscesses in the context of various symptoms patient has been experiencing over the years.  Patient has been keeping the abscesses clean and dry.  Compliant with clindamycin.  No issues with taking antibiotic-she denies abdominal pain or diarrhea.    The following portions of the patient's history were reviewed and updated as appropriate: allergies, current medications, past family history, past medical history, past social history, past surgical history and problem list.      Health Maintenance   Topic Date Due   • ANNUAL PHYSICAL  Never done   • Pneumococcal Vaccine 0-64 (1 of 2 - PPSV23) Never done   • HPV VACCINES (1 - 2-dose series) Never done   • TDAP/TD VACCINES (1 - Tdap) Never done   • HEPATITIS C SCREENING  Never done   • PAP SMEAR  Never done   • INFLUENZA VACCINE  10/01/2021   • COVID-19 Vaccine  Completed         Procedures       Past Medical History:   Diagnosis Date   • Bullous pemphigoid    • Lichen sclerosus    • Psoriasis       Allergies   Allergen Reactions   • Bactrim [Sulfamethoxazole-Trimethoprim] Rash   • Salicylates Itching     Topical meds cause irritation       Social History     Tobacco Use   • Smoking status: Current Every Day Smoker     Packs/day: 0.50      "Years: 4.00     Pack years: 2.00     Types: Cigarettes   • Smokeless tobacco: Never Used   Vaping Use   • Vaping Use: Never used   Substance Use Topics   • Alcohol use: Yes     Comment: couple times a month   • Drug use: Never     Past Surgical History:   Procedure Laterality Date   • WISDOM TOOTH EXTRACTION  2014      History reviewed. No pertinent family history.      Current Outpatient Medications:   •  clindamycin (CLEOCIN) 300 MG capsule, Take 1 capsule by mouth 3 (Three) Times a Day for 10 days., Disp: 30 capsule, Rfl: 0  •  hydrOXYzine (ATARAX) 25 MG tablet, Take 1-2 tablets by mouth 3 (Three) Times a Day As Needed for Itching or Anxiety (rash)., Disp: 60 tablet, Rfl: 0  •  ketoconazole (NIZORAL) 2 % shampoo, , Disp: , Rfl:   •  naproxen (NAPROSYN) 250 MG tablet, 1-2 tablet Q 12 hr PRN pain, Disp: 60 tablet, Rfl: 0  •  predniSONE (DELTASONE) 20 MG tablet, Take 1 tablet by mouth Daily., Disp: 5 tablet, Rfl: 0  •  sertraline (ZOLOFT) 50 MG tablet, Take 1 tablet by mouth Daily., Disp: 30 tablet, Rfl: 3    Objective   Vital Signs  /70   Pulse 83   Temp 96.9 °F (36.1 °C)   Ht 160 cm (63\")   Wt 75.9 kg (167 lb 6.4 oz)   SpO2 99%   BMI 29.65 kg/m²   Body mass index is 29.65 kg/m².     Physical Exam  Vitals and nursing note reviewed.   Constitutional:       Appearance: Normal appearance. She is not ill-appearing or toxic-appearing.   HENT:      Head:        Comments: Abscess sees have improved significantly since last visit.  There is no current drainage seen.  No fluctuance appreciated.  No tenderness on palpation.  Cardiovascular:      Rate and Rhythm: Normal rate and regular rhythm.      Heart sounds: Normal heart sounds.   Pulmonary:      Breath sounds: Normal breath sounds.   Musculoskeletal:      Cervical back: No rigidity.   Skin:     General: Skin is dry.   Neurological:      Mental Status: She is alert and oriented to person, place, and time. Mental status is at baseline.   Psychiatric:         " Mood and Affect: Mood normal.         Behavior: Behavior normal.          Assessment and Plan  Diagnoses and all orders for this visit:    1. History of autoimmune disease (Primary)  Comments:  History of lichen sclerosis and bullous pemphigoid, now with bilateral neck abscesses.  Work-up thus far unremarkable.  Referral to see rheum.  Orders:  -     Ambulatory Referral to Rheumatology    2. Rash  Comments:  Due to Bactrim.  Currently on clindamycin.  Rash is improving    3. Abscess of skin of neck  Comments:  Improving.  Continue clindamycin as directed.  Follow-up with surgery next week.             Follow Up    No follow-ups on file.    Patient was given instructions and counseling regarding her condition or for health maintenance advice. Please see specific information pulled into the AVS if appropriate.    Electronically signed by:   Kadeem Rios MD  10/01/2021

## 2021-10-07 ENCOUNTER — LAB (OUTPATIENT)
Dept: LAB | Facility: HOSPITAL | Age: 26
End: 2021-10-07

## 2021-10-07 ENCOUNTER — OFFICE VISIT (OUTPATIENT)
Dept: INTERNAL MEDICINE | Facility: CLINIC | Age: 26
End: 2021-10-07

## 2021-10-07 VITALS
SYSTOLIC BLOOD PRESSURE: 118 MMHG | WEIGHT: 170.2 LBS | DIASTOLIC BLOOD PRESSURE: 62 MMHG | OXYGEN SATURATION: 99 % | HEIGHT: 63 IN | BODY MASS INDEX: 30.16 KG/M2 | TEMPERATURE: 98.3 F | HEART RATE: 90 BPM | RESPIRATION RATE: 12 BRPM

## 2021-10-07 DIAGNOSIS — R21 RASH: ICD-10-CM

## 2021-10-07 DIAGNOSIS — F32.5 MAJOR DEPRESSIVE DISORDER WITH SINGLE EPISODE, IN FULL REMISSION (HCC): ICD-10-CM

## 2021-10-07 DIAGNOSIS — F41.9 ANXIETY: Primary | ICD-10-CM

## 2021-10-07 DIAGNOSIS — R23.8 BULLOUS LESION: Primary | ICD-10-CM

## 2021-10-07 DIAGNOSIS — R23.8 BULLOUS LESION: ICD-10-CM

## 2021-10-07 PROCEDURE — 86256 FLUORESCENT ANTIBODY TITER: CPT

## 2021-10-07 PROCEDURE — 36415 COLL VENOUS BLD VENIPUNCTURE: CPT

## 2021-10-07 PROCEDURE — 99213 OFFICE O/P EST LOW 20 MIN: CPT | Performed by: FAMILY MEDICINE

## 2021-10-07 PROCEDURE — 86255 FLUORESCENT ANTIBODY SCREEN: CPT

## 2021-10-07 RX ORDER — HYDROXYZINE HYDROCHLORIDE 25 MG/1
25-50 TABLET, FILM COATED ORAL 3 TIMES DAILY PRN
Qty: 60 TABLET | Refills: 3 | Status: SHIPPED | OUTPATIENT
Start: 2021-10-07 | End: 2022-01-07 | Stop reason: SDUPTHER

## 2021-10-07 NOTE — PROGRESS NOTES
"Syd Jaeger is a 26 y.o. female.     Chief Complaint   Patient presents with   • Anxiety     3 month follow up       Visit Vitals  /62   Pulse 90   Temp 98.3 °F (36.8 °C)   Resp 12   Ht 160 cm (63\")   Wt 77.2 kg (170 lb 3.2 oz)   SpO2 99%   BMI 30.15 kg/m²         Anxiety  Presents for follow-up visit. Patient reports no chest pain, compulsions, confusion, decreased concentration, depressed mood, dizziness, dry mouth, excessive worry, feeling of choking, hyperventilation, insomnia, irritability, malaise, muscle tension, nausea, nervous/anxious behavior, obsessions, palpitations, panic, restlessness, shortness of breath or suicidal ideas. Symptoms occur rarely. The severity of symptoms is mild. The quality of sleep is good. Nighttime awakenings: none.     Compliance with medications is %.      Pt is taking hydroxyzine and zoloft for her anxiety and reports that she is doing well.   Pt has abscesses on the back of her head.  Pt has appt with surgeon tomorrow.  Pt has been diagnosed with bullous phemphigoid in the past.   Pt has GGM with RA.     The following portions of the patient's history were reviewed and updated as appropriate: allergies, current medications, past family history, past medical history, past social history, past surgical history and problem list.    Past Medical History:   Diagnosis Date   • Bullous pemphigoid    • Lichen sclerosus    • Psoriasis       Past Surgical History:   Procedure Laterality Date   • WISDOM TOOTH EXTRACTION  2014      History reviewed. No pertinent family history.   Social History     Socioeconomic History   • Marital status: Single     Spouse name: Not on file   • Number of children: Not on file   • Years of education: Not on file   • Highest education level: Not on file   Tobacco Use   • Smoking status: Current Every Day Smoker     Packs/day: 0.50     Years: 4.00     Pack years: 2.00     Types: Cigarettes   • Smokeless tobacco: Never Used "   Vaping Use   • Vaping Use: Never used   Substance and Sexual Activity   • Alcohol use: Yes     Comment: couple times a month   • Drug use: Never   • Sexual activity: Not Currently     Partners: Male     Birth control/protection: OCP      Allergies   Allergen Reactions   • Bactrim [Sulfamethoxazole-Trimethoprim] Rash   • Salicylates Itching     Topical meds cause irritation        Review of Systems   Constitutional: Negative for irritability.   Respiratory: Negative for shortness of breath.    Cardiovascular: Negative for chest pain and palpitations.   Gastrointestinal: Negative for nausea.   Neurological: Negative for dizziness.   Psychiatric/Behavioral: Negative for confusion, decreased concentration and suicidal ideas. The patient is not nervous/anxious and does not have insomnia.        Objective   Physical Exam  Vitals and nursing note reviewed.   Constitutional:       Appearance: She is well-developed.   HENT:      Head: Normocephalic.      Right Ear: External ear normal.      Left Ear: External ear normal.      Nose: Nose normal.   Eyes:      General: Lids are normal.      Conjunctiva/sclera: Conjunctivae normal.      Pupils: Pupils are equal, round, and reactive to light.   Neck:      Thyroid: No thyroid mass or thyromegaly.      Vascular: No carotid bruit.      Trachea: Trachea normal.   Cardiovascular:      Rate and Rhythm: Normal rate and regular rhythm.      Heart sounds: No murmur heard.     Pulmonary:      Effort: Pulmonary effort is normal. No respiratory distress.      Breath sounds: Normal breath sounds. No decreased breath sounds, wheezing, rhonchi or rales.   Chest:      Chest wall: No tenderness.   Abdominal:      General: Bowel sounds are normal.      Palpations: Abdomen is soft.      Tenderness: There is no abdominal tenderness.   Musculoskeletal:         General: Normal range of motion.      Cervical back: Normal range of motion and neck supple.   Skin:     General: Skin is warm and dry.       Findings: Rash present. Rash is nodular.          Neurological:      Mental Status: She is alert and oriented to person, place, and time.   Psychiatric:         Behavior: Behavior normal.         Assessment/Plan   Diagnoses and all orders for this visit:    1. Anxiety (Primary)  -     sertraline (ZOLOFT) 50 MG tablet; Take 1 tablet by mouth Daily.  Dispense: 30 tablet; Refill: 3  -     hydrOXYzine (ATARAX) 25 MG tablet; Take 1-2 tablets by mouth 3 (Three) Times a Day As Needed for Itching or Anxiety (rash).  Dispense: 60 tablet; Refill: 3    2. Bullous lesion  -     Nuris / - Miscellaneous Test; Future    3. Major depressive disorder with single episode, in full remission (HCC)  -     sertraline (ZOLOFT) 50 MG tablet; Take 1 tablet by mouth Daily.  Dispense: 30 tablet; Refill: 3    4. Rash  -     hydrOXYzine (ATARAX) 25 MG tablet; Take 1-2 tablets by mouth 3 (Three) Times a Day As Needed for Itching or Anxiety (rash).  Dispense: 60 tablet; Refill: 3    Other orders  -     mupirocin (Bactroban) 2 % ointment; Apply 1 application topically to the appropriate area as directed 3 (Three) Times a Day.  Dispense: 30 g; Refill: 1                   Current Outpatient Medications:   •  clindamycin (CLEOCIN) 300 MG capsule, Take 1 capsule by mouth 3 (Three) Times a Day for 10 days., Disp: 30 capsule, Rfl: 0  •  hydrOXYzine (ATARAX) 25 MG tablet, Take 1-2 tablets by mouth 3 (Three) Times a Day As Needed for Itching or Anxiety (rash)., Disp: 60 tablet, Rfl: 3  •  ketoconazole (NIZORAL) 2 % shampoo, , Disp: , Rfl:   •  sertraline (ZOLOFT) 50 MG tablet, Take 1 tablet by mouth Daily., Disp: 30 tablet, Rfl: 3  •  mupirocin (Bactroban) 2 % ointment, Apply 1 application topically to the appropriate area as directed 3 (Three) Times a Day., Disp: 30 g, Rfl: 1    Return in about 3 months (around 1/7/2022), or if symptoms worsen or fail to improve, for Recheck.       Answers for HPI/ROS submitted by the patient on  10/5/2021  Please describe your symptoms.: Follow-up for anxiety and abscesses  Have you had these symptoms before?: Yes  How long have you been having these symptoms?: Greater than 2 weeks  Please list any medications you are currently taking for this condition.: All meds listed in chart.  What is the primary reason for your visit?: Other    Recent Results (from the past 672 hour(s))   Comprehensive Metabolic Panel    Collection Time: 09/28/21 12:30 PM    Specimen: Blood   Result Value Ref Range    Glucose 75 65 - 99 mg/dL    BUN 7 6 - 20 mg/dL    Creatinine 0.74 0.57 - 1.00 mg/dL    Sodium 139 136 - 145 mmol/L    Potassium 4.1 3.5 - 5.2 mmol/L    Chloride 104 98 - 107 mmol/L    CO2 22.9 22.0 - 29.0 mmol/L    Calcium 9.1 8.6 - 10.5 mg/dL    Total Protein 7.5 6.0 - 8.5 g/dL    Albumin 4.70 3.50 - 5.20 g/dL    ALT (SGPT) 20 1 - 33 U/L    AST (SGOT) 17 1 - 32 U/L    Alkaline Phosphatase 92 39 - 117 U/L    Total Bilirubin 0.3 0.0 - 1.2 mg/dL    eGFR Non African Amer 96 >60 mL/min/1.73    Globulin 2.8 gm/dL    A/G Ratio 1.7 g/dL    BUN/Creatinine Ratio 9.5 7.0 - 25.0    Anion Gap 12.1 5.0 - 15.0 mmol/L   CBC Auto Differential    Collection Time: 09/28/21 12:30 PM    Specimen: Blood   Result Value Ref Range    WBC 5.36 3.40 - 10.80 10*3/mm3    RBC 4.56 3.77 - 5.28 10*6/mm3    Hemoglobin 14.3 12.0 - 15.9 g/dL    Hematocrit 43.5 34.0 - 46.6 %    MCV 95.4 79.0 - 97.0 fL    MCH 31.4 26.6 - 33.0 pg    MCHC 32.9 31.5 - 35.7 g/dL    RDW 12.9 12.3 - 15.4 %    RDW-SD 45.0 37.0 - 54.0 fl    MPV 10.0 6.0 - 12.0 fL    Platelets 290 140 - 450 10*3/mm3    Neutrophil % 55.4 42.7 - 76.0 %    Lymphocyte % 26.9 19.6 - 45.3 %    Monocyte % 13.1 (H) 5.0 - 12.0 %    Eosinophil % 3.7 0.3 - 6.2 %    Basophil % 0.7 0.0 - 1.5 %    Immature Grans % 0.2 0.0 - 0.5 %    Neutrophils, Absolute 2.97 1.70 - 7.00 10*3/mm3    Lymphocytes, Absolute 1.44 0.70 - 3.10 10*3/mm3    Monocytes, Absolute 0.70 0.10 - 0.90 10*3/mm3    Eosinophils, Absolute 0.20 0.00  - 0.40 10*3/mm3    Basophils, Absolute 0.04 0.00 - 0.20 10*3/mm3    Immature Grans, Absolute 0.01 0.00 - 0.05 10*3/mm3    nRBC 0.0 0.0 - 0.2 /100 WBC   TSH Rfx On Abnormal To Free T4    Collection Time: 09/28/21 12:30 PM    Specimen: Blood   Result Value Ref Range    TSH 1.410 0.270 - 4.200 uIU/mL   Rheumatoid factor    Collection Time: 09/28/21 12:30 PM    Specimen: Blood   Result Value Ref Range    Rheumatoid Factor Quantitative <10.0 0.0 - 14.0 IU/mL   C-reactive protein    Collection Time: 09/28/21 12:30 PM    Specimen: Blood   Result Value Ref Range    C-Reactive Protein 0.48 0.00 - 0.50 mg/dL   RAUL    Collection Time: 09/28/21 12:30 PM    Specimen: Blood   Result Value Ref Range    RAUL Direct Negative Negative   Sedimentation rate, automated    Collection Time: 09/28/21 12:30 PM    Specimen: Blood   Result Value Ref Range    Sed Rate 22 (H) 0 - 20 mm/hr   Cyclic Citrul Peptide Antibody, IgG / IgA    Collection Time: 09/28/21 12:30 PM    Specimen: Blood   Result Value Ref Range    CCP Antibodies IgG/IgA 5 0 - 19 units

## 2021-10-18 ENCOUNTER — PATIENT MESSAGE (OUTPATIENT)
Dept: INTERNAL MEDICINE | Facility: CLINIC | Age: 26
End: 2021-10-18

## 2021-10-18 NOTE — TELEPHONE ENCOUNTER
From: Gwendolyn Jaeger  To: Kadeem Rios MD  Sent: 10/18/2021 8:24 AM EDT  Subject: Referral Request    Just following-up on my referral for the rheumatologist - I know it can take a while but I just wanted to check in on it!    Thank you!

## 2021-10-25 ENCOUNTER — TELEPHONE (OUTPATIENT)
Dept: INTERNAL MEDICINE | Facility: CLINIC | Age: 26
End: 2021-10-25

## 2021-10-28 LAB
BMZ BP180 IGG SERPL-ACNC: 1.8 U/ML
BMZ BP230 IGG SERPL-ACNC: 3.3 U/ML

## 2021-11-09 ENCOUNTER — PATIENT MESSAGE (OUTPATIENT)
Dept: INTERNAL MEDICINE | Facility: CLINIC | Age: 26
End: 2021-11-09

## 2021-11-09 NOTE — TELEPHONE ENCOUNTER
From: Gwendolyn Jaeger  To: Marilee Serrano MD  Sent: 11/9/2021 9:26 AM EST  Subject: Rash     Hi Dr. Serrano,     I have a rash on my chest that appeared starting this weekend - the rash is nowhere else except my chest. I am still taking Hydroxyzine which does not appear to be making the rash or itch better. I have also tried Benadryl and cortisone cream due to the itchiness of it. I have attached a picture. Any thoughts or suggestions on what it could be from?     Thank you,     Gwendolyn Jaeger

## 2021-11-12 DIAGNOSIS — L12.0 BULLOUS PEMPHIGOID: Primary | ICD-10-CM

## 2022-01-07 ENCOUNTER — TELEMEDICINE (OUTPATIENT)
Dept: INTERNAL MEDICINE | Facility: CLINIC | Age: 27
End: 2022-01-07

## 2022-01-07 DIAGNOSIS — F32.5 MAJOR DEPRESSIVE DISORDER WITH SINGLE EPISODE, IN FULL REMISSION: ICD-10-CM

## 2022-01-07 DIAGNOSIS — R21 RASH: ICD-10-CM

## 2022-01-07 DIAGNOSIS — F41.9 ANXIETY: ICD-10-CM

## 2022-01-07 PROCEDURE — 99214 OFFICE O/P EST MOD 30 MIN: CPT | Performed by: FAMILY MEDICINE

## 2022-01-07 RX ORDER — HYDROXYZINE HYDROCHLORIDE 25 MG/1
25-50 TABLET, FILM COATED ORAL 3 TIMES DAILY PRN
Qty: 60 TABLET | Refills: 3 | Status: SHIPPED | OUTPATIENT
Start: 2022-01-07 | End: 2022-05-23 | Stop reason: SDUPTHER

## 2022-01-07 NOTE — PROGRESS NOTES
Subjective   Gwendolyn Jaeger is a 26 y.o. female.     Chief Complaint   Patient presents with   • Anxiety   • Depression     Pt is in [place].   You have chosen to receive care through a telehealth visit.  Do you consent to use a video/audio connection for your medical care today? Yes    This was an audio and video enabled telemedicine encounter.    There were no vitals taken for this visit.      Depression  Visit Type: follow-up  Patient is not experiencing: anhedonia, chest pain, choking sensation, compulsions, confusion, decreased concentration, depressed mood, dizziness, dry mouth, excessive worry, fatigue, feelings of hopelessness, feelings of worthlessness, hypersomnia, hyperventilation, insomnia, irritability, malaise, memory impairment, muscle tension, nausea, nervousness/anxiety, obsessions, palpitations, panic, psychomotor agitation, psychomotor retardation, restlessness, shortness of breath, suicidal ideas, suicidal planning, thoughts of death, weight gain and weight loss.  Frequency of symptoms: rarely   Severity: mild   Sleep quality: good  Nighttime awakenings: occasional  Compliance with medications:  %        Anxiety  Presents for follow-up visit. Patient reports no chest pain, compulsions, confusion, decreased concentration, depressed mood, dry mouth, excessive worry, feeling of choking, hyperventilation, insomnia, irritability, malaise, muscle tension, nausea, nervous/anxious behavior, obsessions, palpitations, panic, restlessness, shortness of breath or suicidal ideas. Symptoms occur rarely. The severity of symptoms is mild. The quality of sleep is good. Nighttime awakenings: occasional.     Her past medical history is significant for depression. Compliance with medications is %.      Pt denies pregnancy. Pt has IUD.   Pt had her Covid booster 2 months ago.   The following portions of the patient's history were reviewed and updated as appropriate: allergies, current medications, past  family history, past medical history, past social history, past surgical history and problem list.    Past Medical History:   Diagnosis Date   • Bullous pemphigoid    • Lichen sclerosus    • Psoriasis       Past Surgical History:   Procedure Laterality Date   • WISDOM TOOTH EXTRACTION  2014      History reviewed. No pertinent family history.   Social History     Socioeconomic History   • Marital status: Single   Tobacco Use   • Smoking status: Current Every Day Smoker     Packs/day: 0.50     Years: 4.00     Pack years: 2.00     Types: Cigarettes   • Smokeless tobacco: Never Used   Vaping Use   • Vaping Use: Never used   Substance and Sexual Activity   • Alcohol use: Yes     Comment: couple times a month   • Drug use: Never   • Sexual activity: Not Currently     Partners: Male     Birth control/protection: OCP      Allergies   Allergen Reactions   • Bactrim [Sulfamethoxazole-Trimethoprim] Rash   • Salicylates Itching     Topical meds cause irritation        Review of Systems   Constitutional: Negative for irritability, weight gain and weight loss.   Respiratory: Negative for choking and shortness of breath.    Cardiovascular: Negative for chest pain and palpitations.   Gastrointestinal: Negative for nausea.   Psychiatric/Behavioral: Negative for confusion, decreased concentration and suicidal ideas. The patient is not nervous/anxious and does not have insomnia.        Objective   Physical Exam  Constitutional:       Comments: Video visit   HENT:      Head: Normocephalic.   Eyes:      Extraocular Movements: Extraocular movements intact.   Pulmonary:      Effort: Pulmonary effort is normal. No respiratory distress.   Musculoskeletal:      Cervical back: Normal range of motion.   Neurological:      Mental Status: She is alert and oriented to person, place, and time.   Psychiatric:         Mood and Affect: Mood normal.         Thought Content: Thought content normal.         Judgment: Judgment normal.          Assessment/Plan   Diagnoses and all orders for this visit:    1. Anxiety  -     sertraline (ZOLOFT) 50 MG tablet; Take 1 tablet by mouth Daily.  Dispense: 30 tablet; Refill: 3  -     hydrOXYzine (ATARAX) 25 MG tablet; Take 1-2 tablets by mouth 3 (Three) Times a Day As Needed for Itching or Anxiety (rash).  Dispense: 60 tablet; Refill: 3    2. Major depressive disorder with single episode, in full remission (HCC)  -     sertraline (ZOLOFT) 50 MG tablet; Take 1 tablet by mouth Daily.  Dispense: 30 tablet; Refill: 3    3. Rash  -     hydrOXYzine (ATARAX) 25 MG tablet; Take 1-2 tablets by mouth 3 (Three) Times a Day As Needed for Itching or Anxiety (rash).  Dispense: 60 tablet; Refill: 3                   Current Outpatient Medications:   •  hydrOXYzine (ATARAX) 25 MG tablet, Take 1-2 tablets by mouth 3 (Three) Times a Day As Needed for Itching or Anxiety (rash)., Disp: 60 tablet, Rfl: 3  •  ketoconazole (NIZORAL) 2 % shampoo, , Disp: , Rfl:   •  sertraline (ZOLOFT) 50 MG tablet, Take 1 tablet by mouth Daily., Disp: 30 tablet, Rfl: 3    Return in about 3 months (around 4/7/2022), or if symptoms worsen or fail to improve, for Annual, Recheck.

## 2022-01-11 ENCOUNTER — TELEPHONE (OUTPATIENT)
Dept: INTERNAL MEDICINE | Facility: CLINIC | Age: 27
End: 2022-01-11

## 2022-01-11 NOTE — TELEPHONE ENCOUNTER
----- Message from Marilee BARAHONA MD sent at 1/7/2022  2:38 PM EST -----  Regarding: covid booster date  Please check [facility], [facility] and [facility] for last Covid vaccine

## 2022-05-23 ENCOUNTER — OFFICE VISIT (OUTPATIENT)
Dept: INTERNAL MEDICINE | Facility: CLINIC | Age: 27
End: 2022-05-23

## 2022-05-23 VITALS
SYSTOLIC BLOOD PRESSURE: 124 MMHG | HEIGHT: 63 IN | WEIGHT: 193 LBS | HEART RATE: 63 BPM | BODY MASS INDEX: 34.2 KG/M2 | OXYGEN SATURATION: 98 % | DIASTOLIC BLOOD PRESSURE: 70 MMHG | TEMPERATURE: 97.5 F

## 2022-05-23 DIAGNOSIS — R21 RASH: ICD-10-CM

## 2022-05-23 DIAGNOSIS — F32.5 MAJOR DEPRESSIVE DISORDER WITH SINGLE EPISODE, IN FULL REMISSION: ICD-10-CM

## 2022-05-23 DIAGNOSIS — F41.9 ANXIETY: ICD-10-CM

## 2022-05-23 PROCEDURE — 99214 OFFICE O/P EST MOD 30 MIN: CPT | Performed by: FAMILY MEDICINE

## 2022-05-23 RX ORDER — HYDROXYZINE HYDROCHLORIDE 25 MG/1
25-50 TABLET, FILM COATED ORAL 3 TIMES DAILY PRN
Qty: 60 TABLET | Refills: 3 | Status: SHIPPED | OUTPATIENT
Start: 2022-05-23

## 2022-05-23 NOTE — PROGRESS NOTES
"Subjective   Gwendolyn Jaeger is a 26 y.o. female.     Chief Complaint   Patient presents with   • Anxiety     Discuss increased dose of zoloft     • Depression       Visit Vitals  /70   Pulse 63   Temp 97.5 °F (36.4 °C)   Ht 160 cm (63\")   Wt 87.5 kg (193 lb)   LMP 05/11/2022 Comment: IUD   SpO2 98%   BMI 34.19 kg/m²       Wt Readings from Last 3 Encounters:   05/23/22 87.5 kg (193 lb)   10/07/21 77.2 kg (170 lb 3.2 oz)   10/01/21 75.9 kg (167 lb 6.4 oz)       Depression  Visit Type: follow-up  Patient presents with the following symptoms: excessive worry, nausea and nervousness/anxiety.  Patient is not experiencing: anhedonia, chest pain, choking sensation, compulsions, confusion, decreased concentration, depressed mood, dizziness, dry mouth, fatigue, feelings of hopelessness, feelings of worthlessness, hypersomnia, hyperventilation, insomnia, irritability, malaise, memory impairment, muscle tension, obsessions, palpitations, panic, psychomotor agitation, psychomotor retardation, restlessness, shortness of breath, suicidal ideas, suicidal planning, thoughts of death, weight gain and weight loss.  Frequency of symptoms: rarely   Severity: mild   Sleep quality: good  Nighttime awakenings: none  Compliance with medications:  %        Anxiety  Presents for follow-up visit. Symptoms include excessive worry, nausea and nervous/anxious behavior. Patient reports no chest pain, compulsions, confusion, decreased concentration, depressed mood, dizziness, dry mouth, feeling of choking, hyperventilation, insomnia, irritability, malaise, muscle tension, obsessions, palpitations, panic, restlessness, shortness of breath or suicidal ideas. Symptoms occur most days. The severity of symptoms is moderate. The quality of sleep is good. Nighttime awakenings: none.     Her past medical history is significant for depression. Compliance with medications is %.      Pt has a history of rash that gets worse in the " summer. Pt is worried that the rash will recur and she requests increasing the sertraline. Pt is also worried about finances.   Pt has increased her exercise with cycling. Pt has gained some weight.   The following portions of the patient's history were reviewed and updated as appropriate: allergies, current medications, past family history, past medical history, past social history, past surgical history and problem list.    Past Medical History:   Diagnosis Date   • Anxiety 2021   • Bullous pemphigoid    • Depression 2021   • Headache    • Hypertension    • Lichen sclerosus    • Psoriasis       Past Surgical History:   Procedure Laterality Date   • WISDOM TOOTH EXTRACTION        Family History   Problem Relation Age of Onset   • Alcohol abuse Father    • Drug abuse Father    • Cancer Maternal Grandmother         Melanoma   • Arthritis Paternal Grandmother    • Cancer Paternal Grandmother         Lung      Social History     Socioeconomic History   • Marital status: Single   Tobacco Use   • Smoking status: Current Every Day Smoker     Packs/day: 0.25     Years: 4.00     Pack years: 1.00     Types: Cigarettes     Last attempt to quit: 10/2021     Years since quittin.6   • Smokeless tobacco: Never Used   • Tobacco comment: currently vapes since 10/2021   Vaping Use   • Vaping Use: Never used   Substance and Sexual Activity   • Alcohol use: Yes     Comment: couple times a month   • Drug use: Never   • Sexual activity: Yes     Partners: Male     Birth control/protection: I.U.D.      Allergies   Allergen Reactions   • Bactrim [Sulfamethoxazole-Trimethoprim] Rash   • Salicylates Itching     Topical meds cause irritation        Review of Systems   Constitutional: Negative for irritability, weight gain and weight loss.   Respiratory: Negative for choking and shortness of breath.    Cardiovascular: Negative for chest pain and palpitations.   Gastrointestinal: Positive for nausea.   Neurological: Negative  for dizziness.   Psychiatric/Behavioral: Negative for confusion, decreased concentration and suicidal ideas. The patient is nervous/anxious. The patient does not have insomnia.              Objective   Physical Exam  Vitals and nursing note reviewed.   Constitutional:       Appearance: She is well-developed.   HENT:      Head: Normocephalic.      Right Ear: External ear normal.      Left Ear: External ear normal.      Nose: Nose normal.   Eyes:      General: Lids are normal.      Conjunctiva/sclera: Conjunctivae normal.      Pupils: Pupils are equal, round, and reactive to light.   Neck:      Thyroid: No thyroid mass or thyromegaly.      Vascular: No carotid bruit.      Trachea: Trachea normal.   Cardiovascular:      Rate and Rhythm: Normal rate and regular rhythm.      Heart sounds: No murmur heard.  Pulmonary:      Effort: Pulmonary effort is normal. No respiratory distress.      Breath sounds: Normal breath sounds. No decreased breath sounds, wheezing, rhonchi or rales.   Chest:      Chest wall: No tenderness.   Abdominal:      General: Bowel sounds are normal.      Palpations: Abdomen is soft.      Tenderness: There is no abdominal tenderness.   Musculoskeletal:         General: Normal range of motion.      Cervical back: Normal range of motion and neck supple.   Skin:     General: Skin is warm and dry.   Neurological:      Mental Status: She is alert and oriented to person, place, and time.   Psychiatric:         Behavior: Behavior normal.         Assessment & Plan   Diagnoses and all orders for this visit:    1. Anxiety  -     hydrOXYzine (ATARAX) 25 MG tablet; Take 1-2 tablets by mouth 3 (Three) Times a Day As Needed for Itching or Anxiety (rash).  Dispense: 60 tablet; Refill: 3  -     sertraline (ZOLOFT) 50 MG tablet; Take 1.5 tablets by mouth Daily.  Dispense: 45 tablet; Refill: 3    2. Rash  -     hydrOXYzine (ATARAX) 25 MG tablet; Take 1-2 tablets by mouth 3 (Three) Times a Day As Needed for Itching or  Anxiety (rash).  Dispense: 60 tablet; Refill: 3    3. Major depressive disorder with single episode, in full remission (HCC)  -     sertraline (ZOLOFT) 50 MG tablet; Take 1.5 tablets by mouth Daily.  Dispense: 45 tablet; Refill: 3        Increase sertraline to 75 mg per day.            Current Outpatient Medications:   •  hydrOXYzine (ATARAX) 25 MG tablet, Take 1-2 tablets by mouth 3 (Three) Times a Day As Needed for Itching or Anxiety (rash)., Disp: 60 tablet, Rfl: 3  •  ketoconazole (NIZORAL) 2 % shampoo, , Disp: , Rfl:   •  sertraline (ZOLOFT) 50 MG tablet, Take 1.5 tablets by mouth Daily., Disp: 45 tablet, Rfl: 3    Return in about 4 weeks (around 6/20/2022), or if symptoms worsen or fail to improve, for Recheck.  Answers for HPI/ROS submitted by the patient on 5/16/2022  Please describe your symptoms.: medication follow up  Have you had these symptoms before?: Yes  How long have you been having these symptoms?: Greater than 2 weeks  Please list any medications you are currently taking for this condition.: sertraline , hydroxizine  What is the primary reason for your visit?: Other

## 2024-06-29 ENCOUNTER — HOSPITAL ENCOUNTER (EMERGENCY)
Facility: HOSPITAL | Age: 29
Discharge: HOME | End: 2024-06-29
Attending: EMERGENCY MEDICINE
Payer: COMMERCIAL

## 2024-06-29 VITALS
DIASTOLIC BLOOD PRESSURE: 80 MMHG | WEIGHT: 187 LBS | SYSTOLIC BLOOD PRESSURE: 145 MMHG | OXYGEN SATURATION: 99 % | TEMPERATURE: 98.4 F | HEIGHT: 63 IN | HEART RATE: 98 BPM | BODY MASS INDEX: 33.13 KG/M2 | RESPIRATION RATE: 18 BRPM

## 2024-06-29 DIAGNOSIS — L13.9 BULLOUS DERMATITIS: Primary | ICD-10-CM

## 2024-06-29 PROCEDURE — 99282 EMERGENCY DEPT VISIT SF MDM: CPT

## 2024-06-29 RX ORDER — PREDNISONE 20 MG/1
TABLET ORAL
Qty: 18 TABLET | Refills: 0 | Status: SHIPPED | OUTPATIENT
Start: 2024-06-29

## 2024-06-29 NOTE — ED PROVIDER NOTES
Subjective   History of Present Illness  28-year-old female presents emergency department today with a rash to the dorsal aspect of her right hand.  She reports this been a recurrent problem she seen both dermatology and allergist for.  Last flareup as she had she had biopsies done and states was given the diagnosis of bullous dermatitis that she states that steroids seem to help with the do not have an exact diagnosis.  She has had no recent exposure to any new plant or food.  She is had some itching associated with this no pain she states she gets blisters that that will eventually scab over.  They are not painful.    History provided by:  Patient   used: No    Blister  Location:  Multiple blisters dorsal aspect of right hand.  Quality:  Itching  Severity:  Moderate  Onset quality:  Sudden  Duration:  3 days  Timing:  Constant  Progression:  Worsening  Chronicity:  Recurrent  Context:  History of same has had biopsies by dermatology.  Relieved by:  Steroid  Worsened by:  Seems to occur during the summer mostly  Associated symptoms: no abdominal pain, no cough, no fever, no myalgias, no nausea, no rhinorrhea, no vomiting and no wheezing        Review of Systems   Constitutional:  Negative for fever.   HENT:  Negative for rhinorrhea.    Respiratory:  Negative for cough and wheezing.    Gastrointestinal:  Negative for abdominal pain, nausea and vomiting.   Musculoskeletal:  Negative for myalgias.       Past Medical History:   Diagnosis Date   • Anxiety 06/01/2021   • Bullous pemphigoid    • Depression 06/01/2021   • Headache    • Hypertension    • Lichen sclerosus    • Psoriasis        Allergies   Allergen Reactions   • Bactrim [Sulfamethoxazole-Trimethoprim] Rash   • Salicylates Itching     Topical meds cause irritation        Past Surgical History:   Procedure Laterality Date   • WISDOM TOOTH EXTRACTION  2014       Family History   Problem Relation Age of Onset   • Alcohol abuse Father    • Drug  abuse Father    • Cancer Maternal Grandmother         Melanoma   • Arthritis Paternal Grandmother    • Cancer Paternal Grandmother         Lung       Social History     Socioeconomic History   • Marital status: Single   Tobacco Use   • Smoking status: Every Day     Current packs/day: 0.00     Average packs/day: 0.3 packs/day for 4.0 years (1.0 ttl pk-yrs)     Types: Cigarettes     Start date: 10/2017     Last attempt to quit: 10/2021     Years since quittin.7   • Smokeless tobacco: Never   • Tobacco comments:     currently vapes since 10/2021   Vaping Use   • Vaping status: Never Used   Substance and Sexual Activity   • Alcohol use: Yes     Comment: couple times a month   • Drug use: Never   • Sexual activity: Yes     Partners: Male     Birth control/protection: I.U.D.           Objective   Physical Exam  Vitals and nursing note reviewed.   Constitutional:       General: She is not in acute distress.     Appearance: She is well-developed. She is not diaphoretic.   HENT:      Head: Normocephalic and atraumatic.      Nose: Nose normal.   Eyes:      General: No scleral icterus.     Conjunctiva/sclera: Conjunctivae normal.   Cardiovascular:      Rate and Rhythm: Normal rate.   Pulmonary:      Effort: Pulmonary effort is normal. No respiratory distress.   Musculoskeletal:      Cervical back: Normal range of motion and neck supple.      Comments: Dorsal aspect of right hand has several large bulla noted.  There is no surrounding redness no induration.  These do not appear to be zoster.  Full range of motion of all digits cap refill less than 2 seconds sensations intact.  No rash noted on her scalp neck arms other than the right hand.   Skin:     General: Skin is warm and dry.   Neurological:      Mental Status: She is alert and oriented to person, place, and time.   Psychiatric:         Behavior: Behavior normal.       Procedures           ED Course  ED Course as of 24 1500   Sat 2024   1500 Extensive  workup by dermatology in the past.  I will write a short course of some steroids she will call her dermatologist on Monday. [JOSI]      ED Course User Index  [JOSI] Vimal Walker PA                                             Medical Decision Making  Problems Addressed:  Bullous dermatitis: complicated acute illness or injury    Risk  Prescription drug management.        Final diagnoses:   Bullous dermatitis       ED Disposition  ED Disposition       ED Disposition   Discharge    Condition   Stable    Comment   --               Marilee Serrano MD  [address] [facility]  [address]  [place] [address]  [phone]               Medication List        New Prescriptions      predniSONE 20 MG tablet  Commonly known as: DELTASONE  60 mg p.o. daily for 3 days then 40 mg p.o. daily for 3 days then 20 mg p.o. daily for 3 days               Where to Get Your Medications        These medications were sent to [facility] [id number] - [place], [place] - [address] AT [facility] [address] - [phone] PH - [phone]   [address] [facility] [address], [facility] [address]      Phone: [phone]   predniSONE 20 MG tablet            Vimal Walker PA  06/29/24 8129